# Patient Record
Sex: FEMALE | Race: AMERICAN INDIAN OR ALASKA NATIVE
[De-identification: names, ages, dates, MRNs, and addresses within clinical notes are randomized per-mention and may not be internally consistent; named-entity substitution may affect disease eponyms.]

---

## 2019-01-13 ENCOUNTER — HOSPITAL ENCOUNTER (EMERGENCY)
Dept: HOSPITAL 5 - ED | Age: 54
LOS: 1 days | Discharge: HOME | End: 2019-01-14
Payer: SELF-PAY
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
## 2019-01-13 DIAGNOSIS — S80.01XA: Primary | ICD-10-CM

## 2019-01-13 DIAGNOSIS — E66.9: ICD-10-CM

## 2019-01-13 PROCEDURE — 99283 EMERGENCY DEPT VISIT LOW MDM: CPT

## 2019-01-13 NOTE — XRAY REPORT
FINAL REPORT



PROCEDURE:  Right knee. 



TECHNIQUE:  Three views.



HISTORY:  right knee injury 



COMPARISON:  No prior studies are available for comparison.



FINDINGS:  

The bones appear intact without fracture or dislocation. There is mild narrowing of the knee joint. T
here are small osteophytes. The soft tissues are unremarkable. There is no evidence of a knee effusio
n.



IMPRESSION:  

Mild osteoarthritis.

## 2019-01-13 NOTE — EMERGENCY DEPARTMENT REPORT
ED Lower Extremity HPI





- General


Chief Complaint: Extremity Injury, Lower


Stated Complaint: FELL/RT KNEE PAIN


Time Seen by Provider: 01/13/19 21:59


Source: patient


Mode of arrival: Ambulatory


Limitations: No Limitations





- History of Present Illness


Initial Comments: 





was at [employer] and slipped on the wet floor and and hit knee on floor. right knee

took most of fall. no head trauma. no loc. Denies neck and back pain. 


MD Complaint: knee injury


-: Sudden


Injury: Knee: Right


Type of Injury: blunt


Place: home


Severity: mild


Improves With: nothing


Worsens With: nothing


Context: direct blow





- Related Data


                                  Previous Rx's











 Medication  Instructions  Recorded  Last Taken  Type


 


traMADol [Ultram] 50 mg PO Q6HR PRN #20 tablet 01/14/19 Unknown Rx











                                    Allergies











Allergy/AdvReac Type Severity Reaction Status Date / Time


 


No Known Allergies Allergy   Verified 11/20/14 02:33














ED Review of Systems


ROS: 


Stated complaint: FELL/RT KNEE PAIN


Other details as noted in HPI





Constitutional: denies: chills, fever


Eyes: denies: eye pain, eye discharge, vision change


ENT: denies: ear pain, throat pain


Respiratory: denies: cough, shortness of breath, wheezing


Cardiovascular: denies: chest pain, palpitations


Endocrine: no symptoms reported


Gastrointestinal: denies: abdominal pain, nausea, diarrhea


Genitourinary: denies: urgency, dysuria, discharge


Musculoskeletal: joint swelling, arthralgia.  denies: back pain


Skin: denies: rash, lesions


Neurological: denies: headache, weakness, paresthesias


Psychiatric: denies: anxiety, depression


Hematological/Lymphatic: denies: easy bleeding, easy bruising





ED Past Medical Hx





- Past Medical History


Additional medical history: Obesity





- Surgical History


Past Surgical History?: No





- Social History


Smoking Status: Never Smoker


Substance Use Type: None





- Medications


Home Medications: 


                                Home Medications











 Medication  Instructions  Recorded  Confirmed  Last Taken  Type


 


traMADol [Ultram] 50 mg PO Q6HR PRN #20 tablet 01/14/19  Unknown Rx














ED Physical Exam





- General


Limitations: No Limitations


General appearance: alert, in no apparent distress





- Head


Head exam: Present: atraumatic, normocephalic





- Eye


Eye exam: Present: normal appearance





- ENT


ENT exam: Present: mucous membranes moist





- Neck


Neck exam: Present: normal inspection





- Respiratory


Respiratory exam: Present: normal lung sounds bilaterally.  Absent: respiratory 

distress, wheezes, rales





- Cardiovascular


Cardiovascular Exam: Present: regular rate, normal rhythm.  Absent: systolic 

murmur, diastolic murmur, rubs, gallop





- GI/Abdominal


GI/Abdominal exam: Present: soft, normal bowel sounds





- Extremities Exam


Extremities exam: Present: normal inspection





- Back Exam


Back exam: Present: normal inspection





- Neurological Exam


Neurological exam: Present: alert, oriented X3





- Psychiatric


Psychiatric exam: Present: normal affect, normal mood





- Skin


Skin exam: Present: warm, dry, intact, normal color.  Absent: rash





ED Course





                                   Vital Signs











  01/13/19





  20:00


 


Temperature 97.9 F


 


Pulse Rate 54 L


 


Respiratory 18





Rate 


 


Blood Pressure 151/70


 


O2 Sat by Pulse 99





Oximetry 














ED Lower Extremity MDM





- Radiology Data


Radiology results: report reviewed





Mild osteoarthritis. no acute processes





Critical care attestation.: 


If time is entered above; I have spent that time in minutes in the direct care 

of this critically ill patient, excluding procedure time.








ED Disposition


Clinical Impression: 


 Contusion of right knee





Disposition: DC-01 TO HOME OR SELFCARE


Is pt being admited?: No


Does the pt Need Aspirin: No


Condition: Stable


Instructions:  Knee Pain (ED), RICE Therapy (ED), Ice Pack Application (ED), 

Contusion in Adults (ED)


Prescriptions: 


traMADol [Ultram] 50 mg PO Q6HR PRN #20 tablet


 PRN Reason: Pain


Referrals: 


PRIMARY CARE,MD [Primary Care Provider] - 3-5 Days


RAGHAV SLOAN MD [Staff Physician] - 3-5 Days


[facility] [Provider Group] - 3-5 Days

## 2019-01-14 VITALS — SYSTOLIC BLOOD PRESSURE: 135 MMHG | DIASTOLIC BLOOD PRESSURE: 72 MMHG

## 2019-09-29 ENCOUNTER — HOSPITAL ENCOUNTER (EMERGENCY)
Dept: HOSPITAL 5 - ED | Age: 54
Discharge: HOME | End: 2019-09-29
Payer: SELF-PAY

## 2019-09-29 VITALS — SYSTOLIC BLOOD PRESSURE: 139 MMHG | DIASTOLIC BLOOD PRESSURE: 82 MMHG

## 2019-09-29 DIAGNOSIS — T78.40XA: Primary | ICD-10-CM
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
## 2019-09-29 NOTE — EMERGENCY DEPARTMENT REPORT
- General


Chief complaint: Skin Rash


Stated complaint: RASH


Time Seen by Provider: 09/29/19 09:31


Source: patient


Mode of arrival: Ambulatory


Limitations: No Limitations





- History of Present Illness


Initial comments: 





53-year-old female with no significant past medical history presents the 

hospital complaining of rash to upper extremities and feet 3 days.  Symptoms 

started after using Dove scented soap.  She denies shortness of breath, throat 

swelling, or fever.  Rash is pruritic.  Taking Benadryl intermittently for 

relief.





- Related Data


                                  Previous Rx's











 Medication  Instructions  Recorded  Last Taken  Type


 


traMADol [Ultram] 50 mg PO Q6HR PRN #20 tablet 01/14/19 Unknown Rx


 


diphenhydrAMINE [Benadryl CAP] 25 mg PO Q6HR PRN #20 capsule 09/29/19 Unknown Rx


 


predniSONE [Deltasone] 40 mg PO QDAY 5 Days  tab 09/29/19 Unknown Rx











                                    Allergies











Allergy/AdvReac Type Severity Reaction Status Date / Time


 


No Known Allergies Allergy   Verified 11/20/14 02:33














Abscess Boil HPI





- HPI


Chief Complaint: Skin Rash


Stated Complaint: RASH


Time Seen by Provider: 09/29/19 09:31


Home Medications: 


                                  Previous Rx's











 Medication  Instructions  Recorded  Last Taken  Type


 


traMADol [Ultram] 50 mg PO Q6HR PRN #20 tablet 01/14/19 Unknown Rx


 


diphenhydrAMINE [Benadryl CAP] 25 mg PO Q6HR PRN #20 capsule 09/29/19 Unknown Rx


 


predniSONE [Deltasone] 40 mg PO QDAY 5 Days  tab 09/29/19 Unknown Rx











Allergies/Adverse Reactions: 


                                    Allergies











Allergy/AdvReac Type Severity Reaction Status Date / Time


 


No Known Allergies Allergy   Verified 11/20/14 02:33














ED Review of Systems


ROS: 


Stated complaint: RASH


Other details as noted in HPI





Comment: All other systems reviewed and negative





ED Past Medical Hx





- Past Medical History


Additional medical history: Obesity





- Social History


Smoking Status: Never Smoker


Substance Use Type: None





- Medications


Home Medications: 


                                Home Medications











 Medication  Instructions  Recorded  Confirmed  Last Taken  Type


 


traMADol [Ultram] 50 mg PO Q6HR PRN #20 tablet 01/14/19  Unknown Rx


 


diphenhydrAMINE [Benadryl CAP] 25 mg PO Q6HR PRN #20 capsule 09/29/19  Unknown 

Rx


 


predniSONE [Deltasone] 40 mg PO QDAY 5 Days  tab 09/29/19  Unknown Rx














ED Physical Exam





- General


Limitations: No Limitations





- Other


Other exam information: 





Gen.: No acute distress


Head: Atraumatic


Eyes: Normal appearance


ENT: Moist mucous membranes, no tongue swelling, posterior pharyngeal swelling, 

or oral lesions.


Neck: Normal appearance, no posterior midline tenderness, no meningismus


Chest: Clear to auscultation bilaterally


Cardiovascular: Regular rate and rhythm


Abdomen: Normal appearance, soft, nontender, no rebound or guarding, normal 

bowel sounds


Back: Normal appearance, nontender


Extremity: Full range of motion, normal appearance


Neuro: Alert and oriented 3, clear speech, no focal motor or sensory deficit


Psychiatric: Appropriate


Skin: Pruritic papular rash to upper extremities and dorsum of foot.  Does not 

involve the palms or soles.





ED Course





                                   Vital Signs











  09/29/19





  08:23


 


Temperature 97.8 F


 


Pulse Rate 71


 


Respiratory 18





Rate 


 


Blood Pressure 139/82


 


O2 Sat by Pulse 99





Oximetry 














ED Medical Decision Making





- Medical Decision Making





Suspect allergy to new exposure to Dove scented soap.  Patient instructed to 

discontinue using the soap.  Will be provided medications for allergic reaction





- Differential Diagnosis


allergic reaction, dermatitis


Critical Care Time: No


Critical care attestation.: 


If time is entered above; I have spent that time in minutes in the direct care 

of this critically ill patient, excluding procedure time.








ED Disposition


Clinical Impression: 


 Pruritic rash, Allergic reaction





Disposition: DC-01 TO HOME OR SELFCARE


Is pt being admited?: No


Does the pt Need Aspirin: No


Condition: Stable


Instructions:  Acute Rash (ED), Allergies (ED)


Additional Instructions: 


Take the medication as prescribed.  Follow-up with your doctor or with the 

doctor/clinic provided.  Return if symptoms worsen as indicated by your 

discharge instructions.


Prescriptions: 


diphenhydrAMINE [Benadryl CAP] 25 mg PO Q6HR PRN #20 capsule


 PRN Reason: Itching


predniSONE [Deltasone] 40 mg PO QDAY 5 Days  tab


Referrals: 


VIJAY MURPHY MD [Primary Care Provider] - 3-5 Days


[facility] [Provider Group] - 3-5 Days


KASSANDRA LEIVA MD [Staff Physician] - 3-5 Days


Time of Disposition: 09:57

## 2021-06-13 ENCOUNTER — HOSPITAL ENCOUNTER (EMERGENCY)
Dept: HOSPITAL 5 - ED | Age: 56
Discharge: HOME | End: 2021-06-13
Payer: SELF-PAY

## 2021-06-13 VITALS — SYSTOLIC BLOOD PRESSURE: 159 MMHG | DIASTOLIC BLOOD PRESSURE: 76 MMHG

## 2021-06-13 DIAGNOSIS — Z98.890: ICD-10-CM

## 2021-06-13 DIAGNOSIS — Z79.899: ICD-10-CM

## 2021-06-13 DIAGNOSIS — L03.115: Primary | ICD-10-CM

## 2021-06-13 DIAGNOSIS — E66.8: ICD-10-CM

## 2021-06-13 PROCEDURE — 99282 EMERGENCY DEPT VISIT SF MDM: CPT

## 2021-06-13 PROCEDURE — 90715 TDAP VACCINE 7 YRS/> IM: CPT

## 2021-06-13 PROCEDURE — 90471 IMMUNIZATION ADMIN: CPT

## 2021-06-13 NOTE — EMERGENCY DEPARTMENT REPORT
- General


Chief complaint: Skin/Abscess/Foreign Body


Stated complaint: BUG BITE


Time Seen by Provider: 06/13/21 14:29


Source: patient


Mode of arrival: Ambulatory


Limitations: No Limitations





- History of Present Illness


Initial comments: 





This is a 55-year-old female nontoxic, well nourished in appearance, no acute 

signs of distress presents to the ED with c/o of redness and pain with mild 

swelling and purulent drainage to right lower leg x2 days.  Patient stated is 

unsure if she was bitten by something.  Patient denies any fever, chills, 

nausea, vomiting, chest pain, shortness of breath, headache or stiff neck.  

Patient denies being up-to-date with tetanus.  Patient denies any allergies or 

significant past medical history.


MD complaint: insect bite/sting, abscess/boil


-: days(s)


Tetanus Up to Date: no


Location: LLE


Severity: mild


Severity scale (0 -10): 8


Quality: aching


Consistency: constant


Improves with: immobilization


Worsens with: palpation


Context: none


Associated symptoms: denies other symptoms


Treatments Prior to Arrival: none





- Related Data


                                  Previous Rx's











 Medication  Instructions  Recorded  Last Taken  Type


 


traMADoL [Ultram] 50 mg PO Q6HR PRN #20 tablet 01/14/19 Unknown Rx


 


diphenhydrAMINE [Benadryl CAP] 25 mg PO Q6HR PRN #20 capsule 09/29/19 Unknown Rx


 


predniSONE [Deltasone] 40 mg PO QDAY 5 Days  tab 09/29/19 Unknown Rx


 


Clindamycin [Clindamycin CAP] 300 mg PO Q6H #28 capsule 06/13/21 Unknown Rx











                                    Allergies











Allergy/AdvReac Type Severity Reaction Status Date / Time


 


No Known Allergies Allergy   Verified 06/13/21 14:07














Abscess Boil HPI





- HPI


Chief Complaint: Skin/Abscess/Foreign Body


Stated Complaint: BUG BITE


Time Seen by Provider: 06/13/21 14:29


Home Medications: 


                                  Previous Rx's











 Medication  Instructions  Recorded  Last Taken  Type


 


traMADoL [Ultram] 50 mg PO Q6HR PRN #20 tablet 01/14/19 Unknown Rx


 


diphenhydrAMINE [Benadryl CAP] 25 mg PO Q6HR PRN #20 capsule 09/29/19 Unknown Rx


 


predniSONE [Deltasone] 40 mg PO QDAY 5 Days  tab 09/29/19 Unknown Rx


 


Clindamycin [Clindamycin CAP] 300 mg PO Q6H #28 capsule 06/13/21 Unknown Rx











Allergies/Adverse Reactions: 


                                    Allergies











Allergy/AdvReac Type Severity Reaction Status Date / Time


 


No Known Allergies Allergy   Verified 06/13/21 14:07














ED Review of Systems


ROS: 


Stated complaint: BUG BITE


Other details as noted in HPI





Comment: All other systems reviewed and negative


Constitutional: denies: chills, fever


Eyes: denies: eye pain, eye discharge, vision change


ENT: denies: ear pain, throat pain


Respiratory: denies: cough, shortness of breath, wheezing


Cardiovascular: denies: chest pain, palpitations


Endocrine: no symptoms reported


Gastrointestinal: denies: abdominal pain, nausea, diarrhea


Genitourinary: denies: urgency, dysuria, discharge


Musculoskeletal: denies: back pain, joint swelling, arthralgia


Skin: denies: rash, lesions


Neurological: denies: headache, weakness, paresthesias


Psychiatric: denies: anxiety, depression


Hematological/Lymphatic: denies: easy bleeding, easy bruising





ED Past Medical Hx





- Past Medical History


Additional medical history: Obesity





- Social History


Smoking Status: Never Smoker


Substance Use Type: None





- Medications


Home Medications: 


                                Home Medications











 Medication  Instructions  Recorded  Confirmed  Last Taken  Type


 


traMADoL [Ultram] 50 mg PO Q6HR PRN #20 tablet 01/14/19  Unknown Rx


 


diphenhydrAMINE [Benadryl CAP] 25 mg PO Q6HR PRN #20 capsule 09/29/19  Unknown 

Rx


 


predniSONE [Deltasone] 40 mg PO QDAY 5 Days  tab 09/29/19  Unknown Rx


 


Clindamycin [Clindamycin CAP] 300 mg PO Q6H #28 capsule 06/13/21  Unknown Rx














ED Physical Exam





- General


Limitations: No Limitations


General appearance: alert, in no apparent distress





- Head


Head exam: Present: atraumatic, normocephalic





- Eye


Eye exam: Present: normal appearance





- Neck


Neck exam: Present: normal inspection, full ROM.  Absent: lymphadenopathy





- Respiratory


Respiratory exam: Absent: respiratory distress





- Cardiovascular


Cardiovascular Exam: Present: regular rate





- Extremities Exam


Extremities exam: Present: normal inspection, full ROM, tenderness, normal 

capillary refill.  Absent: joint swelling, calf tenderness





- Expanded Lower Extremity Exam


  ** Left


Hip exam: Present: normal inspection, full ROM.  Absent: tenderness, swelling


Upper Leg exam: Present: normal inspection, full ROM.  Absent: tenderness, 

swelling


Knee exam: Present: normal inspection, full ROM.  Absent: tenderness, swelling


Lower Leg exam: Present: full ROM, tenderness, swelling (With some purulent 

drainage).  Absent: abrasion, laceration, ecchymosis, deformity, crepidus, 

dislocation, erythema, palpable cord, Javed's sign


Ankle exam: Present: normal inspection.  Absent: full ROM, tenderness


Foot/Toe exam: Present: normal inspection, full ROM.  Absent: tenderness, 

swelling


Neuro vascular tendon exam: Present: no vascular compromise


Gait: Positive: observed and normal





                            __________________________














                            __________________________





 1 - 2 cm x 3 cm cellulitis with punctate noted and some purulent 

serosanguineous drainage noted.  No induration or fluctuance.








- Back Exam


Back exam: Present: normal inspection, full ROM





- Neurological Exam


Neurological exam: Present: alert, oriented X3, normal gait





- Psychiatric


Psychiatric exam: Present: normal affect, normal mood





- Skin


Skin exam: Present: warm, dry, intact, normal color.  Absent: rash





ED Course


                                   Vital Signs











  06/13/21





  14:04


 


Temperature 98.4 F


 


Pulse Rate 60


 


Respiratory 18





Rate 


 


Blood Pressure 159/76


 


O2 Sat by Pulse 100





Oximetry 














- Reevaluation(s)


Reevaluation #1: 





06/13/21 16:00


Patient is speaking in full sentences with no signs of distress noted.





ED Medical Decision Making





- Medical Decision Making





This is a 55-year-old female that presents with cellulitis with draining open wo

und from the punctate area.  Patient is stable and was examined by me.  There is

no induration, fluctuance.  The area has been outlined with a permanent marker 

and patient was instructed to observe symptoms of increased redness or swelling 

and to return to the ER if this does occur.  I will discharge patient with 

clindamycin.  Patient did receive a tetanus booster in the ER.  Patient was 

referred to Follow-up with a primary care doctor in 3-5 days or if symptoms 

worsen and continue return to emergency room as soon as possible.  At time of 

discharge, the patient does not seem toxic or ill in appearance.  No acute signs

of distress noted.  Patient agrees to discharge treatment plan of care.  No 

further questions noted by the patient.


Critical care attestation.: 


If time is entered above; I have spent that time in minutes in the direct care 

of this critically ill patient, excluding procedure time.








ED Disposition


Clinical Impression: 


Cellulitis


Qualifiers:


 Site of cellulitis: extremity Site of cellulitis of extremity: lower extremity 

Laterality: right Qualified Code(s): L03.115 - Cellulitis of right lower limb





Disposition: DC-01 TO HOME OR SELFCARE


Is pt being admited?: No


Does the pt Need Aspirin: No


Condition: Stable


Instructions:  Cellulitis, Adult, Easy-to-Read


Additional Instructions: 


Follow-up with a primary care doctor in 3-5 days or if symptoms worsen and 

continue return to emergency room as soon as possible. 


Prescriptions: 


Clindamycin [Clindamycin CAP] 300 mg PO Q6H #28 capsule


Referrals: 


PRIMARY CARE,MD [Primary Care Provider] - 3-5 Days


KASSANDRA LEIVA MD [Staff Physician] - 3-5 Days


Forms:  Work/School Release Form(ED)


Time of Disposition: 16:18

## 2021-07-16 ENCOUNTER — HOSPITAL ENCOUNTER (EMERGENCY)
Dept: HOSPITAL 5 - ED | Age: 56
Discharge: HOME | End: 2021-07-16
Payer: SELF-PAY

## 2021-07-16 VITALS — DIASTOLIC BLOOD PRESSURE: 60 MMHG | SYSTOLIC BLOOD PRESSURE: 145 MMHG

## 2021-07-16 DIAGNOSIS — E66.9: ICD-10-CM

## 2021-07-16 DIAGNOSIS — Z79.899: ICD-10-CM

## 2021-07-16 DIAGNOSIS — R21: Primary | ICD-10-CM

## 2021-07-16 PROCEDURE — 99282 EMERGENCY DEPT VISIT SF MDM: CPT

## 2021-07-16 NOTE — EMERGENCY DEPARTMENT REPORT
ED General Adult HPI





- General


Chief complaint: Skin Rash


Stated complaint: BROKE OUT ON LEFT/RIGHT ARM


Time Seen by Provider: 07/16/21 10:30


Source: patient


Mode of arrival: Ambulatory


Limitations: No Limitations





- History of Present Illness


Initial comments: 


55-year-old female patient presents to the emergency department with complaints 

of a pruritic rash to her upper extremities starting 2 days ago.  No new foods, 

medications, or environmental/chemical exposures.  No known sick contacts.  No 

recent travel.  No history of similar symptoms.  No known allergies.  Patient 

took Benadryl with limited relief.  Denies headache, neck stiffness, mental 

status changes, seizure, syncope, shortness of breath.  Denies all other 

complaints at this time.








- Related Data


                                  Previous Rx's











 Medication  Instructions  Recorded  Last Taken  Type


 


traMADoL [Ultram] 50 mg PO Q6HR PRN #20 tablet 01/14/19 Unknown Rx


 


diphenhydrAMINE [Benadryl CAP] 25 mg PO Q6HR PRN #20 capsule 09/29/19 Unknown Rx


 


predniSONE [Deltasone] 40 mg PO QDAY 5 Days  tab 09/29/19 Unknown Rx


 


Clindamycin [Clindamycin CAP] 300 mg PO Q6H #28 capsule 06/13/21 Unknown Rx


 


Hydrocortisone 1% [Hydrocortisone 1 applicatio TP TID #1 tube 07/16/21 Unknown 

Rx





1% CREAM]    


 


Hydroxyzine HCl [hydrOXYzine] 25 mg PO TID #20 tablet 07/16/21 Unknown Rx











                                    Allergies











Allergy/AdvReac Type Severity Reaction Status Date / Time


 


No Known Allergies Allergy   Verified 06/13/21 14:07














ED Review of Systems


ROS: 


Stated complaint: BROKE OUT ON LEFT/RIGHT ARM


Other details as noted in HPI





Other: 





GENERAL: Negative for fever. 


CARDIOVASCULAR: Negative for chest pain. 


PULMONARY: Negative for shortness of breath. 


GASTROINTESTINAL: Negative for abdominal pain. 


MUSCULOSKELETAL: Negative for back pain. 


NEUROLOGICAL: Negative for headache. 


INTEGUMENTARY: Positive for rash.





ED Past Medical Hx





- Past Medical History


Previous Medical History?: No


Additional medical history: Obesity





- Surgical History


Past Surgical History?: No





- Social History


Smoking Status: Never Smoker


Substance Use Type: None





- Medications


Home Medications: 


                                Home Medications











 Medication  Instructions  Recorded  Confirmed  Last Taken  Type


 


traMADoL [Ultram] 50 mg PO Q6HR PRN #20 tablet 01/14/19  Unknown Rx


 


diphenhydrAMINE [Benadryl CAP] 25 mg PO Q6HR PRN #20 capsule 09/29/19  Unknown 

Rx


 


predniSONE [Deltasone] 40 mg PO QDAY 5 Days  tab 09/29/19  Unknown Rx


 


Clindamycin [Clindamycin CAP] 300 mg PO Q6H #28 capsule 06/13/21  Unknown Rx


 


Hydrocortisone 1% [Hydrocortisone 1 applicatio TP TID #1 tube 07/16/21  Unknown 

Rx





1% CREAM]     


 


Hydroxyzine HCl [hydrOXYzine] 25 mg PO TID #20 tablet 07/16/21  Unknown Rx














ED Physical Exam





- General


Limitations: No Limitations





- Other


Other exam information: 





General: Awake, appropriately interactive, no acute distress. 


Neck: Supple. Full range of motion intact. 


Cardiovascular: Normal peripheral perfusion. 


Pulmonary: No respiratory distress. Patient is speaking normally without use of 

accessory muscles.


Skin: Dry, macerated, pruritic rash noted to the upper extremities, most promi

nent along the extensor surfaces of the elbows.


Neurological: No facial asymmetry. Speech is clear. Follows commands. Patient is

 alert and oriented. 


Musculoskeletal: Moves all four extremities spontaneously with normal range of 

motion. 


Psych: Cooperative. Appropriate mood and affect.





ED Course


                                   Vital Signs











  07/16/21 07/16/21





  08:10 11:00


 


Temperature 98.2 F 


 


Pulse Rate 51 L 51 L


 


Respiratory 16 18





Rate  


 


Blood Pressure 145/60 


 


O2 Sat by Pulse 100 100





Oximetry  














ED Medical Decision Making





- Medical Decision Making


Differential diagnosis including but not limited to: psoriasis, eczema, contact 

dermatitis, tinea corporis 





Patient presents to the emergency department with signs/symptoms suggestive of 

eczema. She will be discharged home with appropriate symptomatic treatment and 

referred to primary care provider for close outpatient follow-up. The patient is

 afebrile and hemodynamically stable. The patient is alert and well appearing. 

There are no petichiae or purpura, no mucous membrane lesions, and no bullae. 

The patient is without findings concerning for worrisome systemic illness 

requiring further treatment, additional testing, admission, or specialist 

consultation at this time. Additional testing is not indicated at this time, but

 should be considered if symptoms worsen or recur. Discussed findings, 

presumptive diagnosis, need for follow-up and specific signs/symptoms that 

should prompt immediate return to the emergency department. Instructions were 

explained in detail to the patient in addition to giving written discharge 

information. Patient expressed understanding and was given the opportunity to 

ask questions, all of which were satisfactorily answered prior to discharge 

home.





Critical care attestation.: 


If time is entered above; I have spent that time in minutes in the direct care 

of this critically ill patient, excluding procedure time.








ED Disposition


Clinical Impression: 


 Rash and nonspecific skin eruption





Disposition: DC-01 TO HOME OR SELFCARE


Is pt being admited?: No


Does the pt Need Aspirin: No


Condition: Stable


Instructions:  Rash, Adult, Easy-to-Read


Additional Instructions: 


Take Hydroxyzine as needed for itching.


Apply Hydrocortisone cream to affected area as directed.


Keep medication in the refrigerator for added symptomatic relief.


Follow-up with primary care provider this week.  Call today to schedule an 

appointment.  See referral information below.


Return to the emergency department immediately for new or worsening symptoms.


Prescriptions: 


Hydrocortisone 1% [Hydrocortisone 1% CREAM] 1 applicatio TP TID #1 tube


Hydroxyzine HCl [hydrOXYzine] 25 mg PO TID #20 tablet


Referrals: 


KASSANDRA LEIVA MD [Staff Physician] - 3-5 Days


[facility] [Provider Group] - 3-5 Days


Time of Disposition: 10:49